# Patient Record
Sex: MALE | Race: WHITE | NOT HISPANIC OR LATINO | Employment: STUDENT | URBAN - METROPOLITAN AREA
[De-identification: names, ages, dates, MRNs, and addresses within clinical notes are randomized per-mention and may not be internally consistent; named-entity substitution may affect disease eponyms.]

---

## 2023-03-10 ENCOUNTER — APPOINTMENT (OUTPATIENT)
Dept: CARDIOLOGY | Facility: CLINIC | Age: 18
End: 2023-03-10
Attending: FAMILY MEDICINE
Payer: COMMERCIAL

## 2023-03-10 ENCOUNTER — APPOINTMENT (OUTPATIENT)
Dept: GENERAL RADIOLOGY | Facility: CLINIC | Age: 18
End: 2023-03-10
Attending: FAMILY MEDICINE
Payer: COMMERCIAL

## 2023-03-10 ENCOUNTER — HOSPITAL ENCOUNTER (EMERGENCY)
Facility: CLINIC | Age: 18
Discharge: HOME | End: 2023-03-10
Attending: FAMILY MEDICINE | Admitting: FAMILY MEDICINE
Payer: COMMERCIAL

## 2023-03-10 VITALS
WEIGHT: 145 LBS | HEIGHT: 72 IN | SYSTOLIC BLOOD PRESSURE: 113 MMHG | RESPIRATION RATE: 18 BRPM | OXYGEN SATURATION: 100 % | HEART RATE: 89 BPM | DIASTOLIC BLOOD PRESSURE: 69 MMHG | TEMPERATURE: 98.7 F | BODY MASS INDEX: 19.64 KG/M2

## 2023-03-10 DIAGNOSIS — R55 SYNCOPE, VASOVAGAL: ICD-10-CM

## 2023-03-10 DIAGNOSIS — S09.90XA INJURY OF HEAD, INITIAL ENCOUNTER: ICD-10-CM

## 2023-03-10 LAB
ALBUMIN SERPL BCG-MCNC: 4.7 G/DL (ref 3.5–5.2)
ALP SERPL-CCNC: 100 U/L (ref 40–129)
ALT SERPL W P-5'-P-CCNC: 14 U/L (ref 10–50)
ANION GAP SERPL CALCULATED.3IONS-SCNC: 13 MMOL/L (ref 7–15)
APTT PPP: 32 SECONDS (ref 22–38)
AST SERPL W P-5'-P-CCNC: 26 U/L (ref 10–50)
ATRIAL RATE - MUSE: 77 BPM
BASOPHILS # BLD AUTO: 0.1 10E3/UL (ref 0–0.2)
BASOPHILS NFR BLD AUTO: 1 %
BILIRUB SERPL-MCNC: 0.8 MG/DL
BUN SERPL-MCNC: 15.2 MG/DL (ref 6–20)
CALCIUM SERPL-MCNC: 9.6 MG/DL (ref 8.6–10)
CHLORIDE SERPL-SCNC: 104 MMOL/L (ref 98–107)
CREAT SERPL-MCNC: 1.15 MG/DL (ref 0.67–1.17)
D DIMER PPP FEU-MCNC: 0.3 UG/ML FEU (ref 0–0.5)
DEPRECATED HCO3 PLAS-SCNC: 25 MMOL/L (ref 22–29)
DIASTOLIC BLOOD PRESSURE - MUSE: NORMAL MMHG
EOSINOPHIL # BLD AUTO: 0.1 10E3/UL (ref 0–0.7)
EOSINOPHIL NFR BLD AUTO: 2 %
ERYTHROCYTE [DISTWIDTH] IN BLOOD BY AUTOMATED COUNT: 12.8 % (ref 10–15)
FLUAV RNA SPEC QL NAA+PROBE: NEGATIVE
FLUBV RNA RESP QL NAA+PROBE: NEGATIVE
GFR SERPL CREATININE-BSD FRML MDRD: >90 ML/MIN/1.73M2
GLUCOSE SERPL-MCNC: 96 MG/DL (ref 70–99)
HCT VFR BLD AUTO: 49 % (ref 40–53)
HGB BLD-MCNC: 16.8 G/DL (ref 13.3–17.7)
IMM GRANULOCYTES # BLD: 0 10E3/UL
IMM GRANULOCYTES NFR BLD: 0 %
INR PPP: 1.05 (ref 0.85–1.15)
INTERPRETATION ECG - MUSE: NORMAL
LVEF ECHO: NORMAL
LYMPHOCYTES # BLD AUTO: 2 10E3/UL (ref 0.8–5.3)
LYMPHOCYTES NFR BLD AUTO: 36 %
MCH RBC QN AUTO: 30.1 PG (ref 26.5–33)
MCHC RBC AUTO-ENTMCNC: 34.3 G/DL (ref 31.5–36.5)
MCV RBC AUTO: 88 FL (ref 78–100)
MONOCYTES # BLD AUTO: 0.5 10E3/UL (ref 0–1.3)
MONOCYTES NFR BLD AUTO: 9 %
NEUTROPHILS # BLD AUTO: 2.9 10E3/UL (ref 1.6–8.3)
NEUTROPHILS NFR BLD AUTO: 52 %
NRBC # BLD AUTO: 0 10E3/UL
NRBC BLD AUTO-RTO: 0 /100
NT-PROBNP SERPL-MCNC: <5 PG/ML (ref 0–450)
P AXIS - MUSE: 80 DEGREES
PLATELET # BLD AUTO: 226 10E3/UL (ref 150–450)
POTASSIUM SERPL-SCNC: 4.1 MMOL/L (ref 3.4–5.3)
PR INTERVAL - MUSE: 122 MS
PROT SERPL-MCNC: 7 G/DL (ref 6.4–8.3)
QRS DURATION - MUSE: 80 MS
QT - MUSE: 358 MS
QTC - MUSE: 405 MS
R AXIS - MUSE: 82 DEGREES
RBC # BLD AUTO: 5.59 10E6/UL (ref 4.4–5.9)
RSV RNA SPEC NAA+PROBE: NEGATIVE
SARS-COV-2 RNA RESP QL NAA+PROBE: NEGATIVE
SODIUM SERPL-SCNC: 142 MMOL/L (ref 136–145)
SYSTOLIC BLOOD PRESSURE - MUSE: NORMAL MMHG
T AXIS - MUSE: 42 DEGREES
TROPONIN T SERPL HS-MCNC: <6 NG/L
VENTRICULAR RATE- MUSE: 77 BPM
WBC # BLD AUTO: 5.6 10E3/UL (ref 4–11)

## 2023-03-10 PROCEDURE — 99285 EMERGENCY DEPT VISIT HI MDM: CPT | Mod: 25 | Performed by: FAMILY MEDICINE

## 2023-03-10 PROCEDURE — 71046 X-RAY EXAM CHEST 2 VIEWS: CPT | Mod: 26 | Performed by: RADIOLOGY

## 2023-03-10 PROCEDURE — 71046 X-RAY EXAM CHEST 2 VIEWS: CPT

## 2023-03-10 PROCEDURE — 85610 PROTHROMBIN TIME: CPT | Performed by: FAMILY MEDICINE

## 2023-03-10 PROCEDURE — 87637 SARSCOV2&INF A&B&RSV AMP PRB: CPT | Performed by: FAMILY MEDICINE

## 2023-03-10 PROCEDURE — 90715 TDAP VACCINE 7 YRS/> IM: CPT | Performed by: FAMILY MEDICINE

## 2023-03-10 PROCEDURE — 93306 TTE W/DOPPLER COMPLETE: CPT | Mod: 26 | Performed by: INTERNAL MEDICINE

## 2023-03-10 PROCEDURE — 93005 ELECTROCARDIOGRAM TRACING: CPT | Performed by: FAMILY MEDICINE

## 2023-03-10 PROCEDURE — 85379 FIBRIN DEGRADATION QUANT: CPT | Performed by: FAMILY MEDICINE

## 2023-03-10 PROCEDURE — C9803 HOPD COVID-19 SPEC COLLECT: HCPCS | Performed by: FAMILY MEDICINE

## 2023-03-10 PROCEDURE — 258N000003 HC RX IP 258 OP 636: Performed by: FAMILY MEDICINE

## 2023-03-10 PROCEDURE — 80053 COMPREHEN METABOLIC PANEL: CPT | Performed by: FAMILY MEDICINE

## 2023-03-10 PROCEDURE — 36415 COLL VENOUS BLD VENIPUNCTURE: CPT | Performed by: FAMILY MEDICINE

## 2023-03-10 PROCEDURE — 85025 COMPLETE CBC W/AUTO DIFF WBC: CPT | Performed by: FAMILY MEDICINE

## 2023-03-10 PROCEDURE — 84484 ASSAY OF TROPONIN QUANT: CPT | Performed by: FAMILY MEDICINE

## 2023-03-10 PROCEDURE — 85730 THROMBOPLASTIN TIME PARTIAL: CPT | Performed by: FAMILY MEDICINE

## 2023-03-10 PROCEDURE — 250N000011 HC RX IP 250 OP 636: Performed by: FAMILY MEDICINE

## 2023-03-10 PROCEDURE — 93306 TTE W/DOPPLER COMPLETE: CPT

## 2023-03-10 PROCEDURE — 99284 EMERGENCY DEPT VISIT MOD MDM: CPT | Mod: 25 | Performed by: FAMILY MEDICINE

## 2023-03-10 PROCEDURE — 90471 IMMUNIZATION ADMIN: CPT | Performed by: FAMILY MEDICINE

## 2023-03-10 PROCEDURE — 83880 ASSAY OF NATRIURETIC PEPTIDE: CPT | Performed by: FAMILY MEDICINE

## 2023-03-10 PROCEDURE — 93010 ELECTROCARDIOGRAM REPORT: CPT | Performed by: FAMILY MEDICINE

## 2023-03-10 PROCEDURE — 96360 HYDRATION IV INFUSION INIT: CPT | Performed by: FAMILY MEDICINE

## 2023-03-10 RX ORDER — LIDOCAINE 40 MG/G
CREAM TOPICAL
Status: DISCONTINUED | OUTPATIENT
Start: 2023-03-10 | End: 2023-03-10 | Stop reason: HOSPADM

## 2023-03-10 RX ADMIN — CLOSTRIDIUM TETANI TOXOID ANTIGEN (FORMALDEHYDE INACTIVATED), CORYNEBACTERIUM DIPHTHERIAE TOXOID ANTIGEN (FORMALDEHYDE INACTIVATED), BORDETELLA PERTUSSIS TOXOID ANTIGEN (GLUTARALDEHYDE INACTIVATED), BORDETELLA PERTUSSIS FILAMENTOUS HEMAGGLUTININ ANTIGEN (FORMALDEHYDE INACTIVATED), BORDETELLA PERTUSSIS PERTACTIN ANTIGEN, AND BORDETELLA PERTUSSIS FIMBRIAE 2/3 ANTIGEN 0.5 ML: 5; 2; 2.5; 5; 3; 5 INJECTION, SUSPENSION INTRAMUSCULAR at 11:32

## 2023-03-10 RX ADMIN — SODIUM CHLORIDE 1000 ML: 9 INJECTION, SOLUTION INTRAVENOUS at 11:31

## 2023-03-10 ASSESSMENT — ACTIVITIES OF DAILY LIVING (ADL)
ADLS_ACUITY_SCORE: 35

## 2023-03-10 NOTE — ED TRIAGE NOTES
Pt states that he randomly lost consciousness 2 days ago but did not seek medical attention at that time.  Pt states that since then he has experienced chest pain when taking deep breaths along with being dizzy and lightheaded.       Triage Assessment     Row Name 03/10/23 1050       Triage Assessment (Adult)    Airway WDL WDL       Respiratory WDL    Respiratory WDL WDL       Skin Circulation/Temperature WDL    Skin Circulation/Temperature WDL WDL       Cardiac WDL    Cardiac WDL WDL       Peripheral/Neurovascular WDL    Peripheral Neurovascular WDL WDL       Cognitive/Neuro/Behavioral WDL    Cognitive/Neuro/Behavioral WDL WDL

## 2023-03-10 NOTE — DISCHARGE INSTRUCTIONS
Home.  Your labs and echo and cxr and ekg did not show any concerning findings.  More likely a physiological fainting episode.  Home with follow up with your MD  Return if any concerns at all.  Eat well and stay hydrated.    Please make an appointment to follow up with Your Primary Care Provider as soon as possible.    Results for orders placed or performed during the hospital encounter of 03/10/23   XR Chest 2 Views     Status: None    Narrative    EXAM: XR CHEST 2 VIEWS  3/10/2023 12:01 PM     HISTORY:  syncope       COMPARISON:  None    FINDINGS:   Trachea is midline. Cardiac silhouette is within normal limits.  Pulmonary vasculature is distinct. No focal airspace opacity, pleural  effusion, pneumothorax.    No acute osseous abnormality. Visualized soft tissues and upper  abdomen are unremarkable.         Impression    IMPRESSION:   No acute focal airspace disease.    I have personally reviewed the examination and initial interpretation  and I agree with the findings.    CHARLENE STONE MD         SYSTEM ID:  [id number]   Partial thromboplastin time     Status: Normal   Result Value Ref Range    aPTT 32 22 - 38 Seconds   INR     Status: Normal   Result Value Ref Range    INR 1.05 0.85 - 1.15   D dimer quantitative     Status: Normal   Result Value Ref Range    D-Dimer Quantitative 0.30 0.00 - 0.50 ug/mL FEU    Narrative    This D-dimer assay is intended for use in conjunction with a clinical pretest probability assessment model to exclude pulmonary embolism (PE) and deep venous thrombosis (DVT) in outpatients suspected of PE or DVT. The cut-off value is 0.50 ug/mL FEU.   Comprehensive metabolic panel     Status: Normal   Result Value Ref Range    Sodium 142 136 - 145 mmol/L    Potassium 4.1 3.4 - 5.3 mmol/L    Chloride 104 98 - 107 mmol/L    Carbon Dioxide (CO2) 25 22 - 29 mmol/L    Anion Gap 13 7 - 15 mmol/L    Urea Nitrogen 15.2 6.0 - 20.0 mg/dL    Creatinine 1.15 0.67 - 1.17 mg/dL    Calcium 9.6 8.6 - 10.0  mg/dL    Glucose 96 70 - 99 mg/dL    Alkaline Phosphatase 100 40 - 129 U/L    AST 26 10 - 50 U/L    ALT 14 10 - 50 U/L    Protein Total 7.0 6.4 - 8.3 g/dL    Albumin 4.7 3.5 - 5.2 g/dL    Bilirubin Total 0.8 <=1.2 mg/dL    GFR Estimate >90 >60 mL/min/1.73m2   Troponin T, High Sensitivity     Status: Normal   Result Value Ref Range    Troponin T, High Sensitivity <6 <=22 ng/L   Nt probnp inpatient (BNP)     Status: Normal   Result Value Ref Range    N terminal Pro BNP Inpatient <5 0 - 450 pg/mL   Asymptomatic Influenza A/B, RSV, & SARS-CoV2 PCR (COVID-19) Nasopharyngeal     Status: Normal    Specimen: Nasopharyngeal; Swab   Result Value Ref Range    Influenza A PCR Negative Negative    Influenza B PCR Negative Negative    RSV PCR Negative Negative    SARS CoV2 PCR Negative Negative    Narrative    Testing was performed using the Xpert Xpress CoV2/Flu/RSV Assay on the Cepheid GeneXpert Instrument. This test should be ordered for the detection of SARS-CoV-2, influenza, and RSV viruses in individuals who meet clinical and/or epidemiological criteria. Test performance is unknown in asymptomatic patients. This test is for in vitro diagnostic use under the FDA EUA for laboratories certified under CLIA to perform high or moderate complexity testing. This test has not been FDA cleared or approved. A negative result does not rule out the presence of PCR inhibitors in the specimen or target RNA in concentration below the limit of detection for the assay. If only one viral target is positive but coinfection with multiple targets is suspected, the sample should be re-tested with another FDA cleared, approved, or authorized test, if coinfection would change clinical management. This test was validated by the [facility]. These laboratories are certified under the Clinical Laboratory Improvement Amendments of 1988 (CLIA-88) as qualified to perform high complexity laboratory testing.   CBC with platelets and  differential     Status: None   Result Value Ref Range    WBC Count 5.6 4.0 - 11.0 10e3/uL    RBC Count 5.59 4.40 - 5.90 10e6/uL    Hemoglobin 16.8 13.3 - 17.7 g/dL    Hematocrit 49.0 40.0 - 53.0 %    MCV 88 78 - 100 fL    MCH 30.1 26.5 - 33.0 pg    MCHC 34.3 31.5 - 36.5 g/dL    RDW 12.8 10.0 - 15.0 %    Platelet Count 226 150 - 450 10e3/uL    % Neutrophils 52 %    % Lymphocytes 36 %    % Monocytes 9 %    % Eosinophils 2 %    % Basophils 1 %    % Immature Granulocytes 0 %    NRBCs per 100 WBC 0 <1 /100    Absolute Neutrophils 2.9 1.6 - 8.3 10e3/uL    Absolute Lymphocytes 2.0 0.8 - 5.3 10e3/uL    Absolute Monocytes 0.5 0.0 - 1.3 10e3/uL    Absolute Eosinophils 0.1 0.0 - 0.7 10e3/uL    Absolute Basophils 0.1 0.0 - 0.2 10e3/uL    Absolute Immature Granulocytes 0.0 <=0.4 10e3/uL    Absolute NRBCs 0.0 10e3/uL   EKG 12 lead     Status: None   Result Value Ref Range    Systolic Blood Pressure  mmHg    Diastolic Blood Pressure  mmHg    Ventricular Rate 77 BPM    Atrial Rate 77 BPM    AR Interval 122 ms    QRS Duration 80 ms     ms    QTc 405 ms    P Axis 80 degrees    R AXIS 82 degrees    T Axis 42 degrees    Interpretation ECG       Sinus rhythm  Right atrial enlargement  Borderline ECG  Unconfirmed report - interpretation of this ECG is computer generated - see medical record for final interpretation  Confirmed by - EMERGENCY ROOM, PHYSICIAN ([id number]),  EVELIO PACHEOC ([id number]) on 3/10/2023 11:03:04 AM     Echocardiogram Complete     Status: None   Result Value Ref Range    LVEF  60-65%     Narrative    [id number]  [id number]  [id number]  [id number]^SHILO^JAMEL^ANNETTE     [facility],[place]  Echocardiography Laboratory  [address] [place] [id number]     Name: BRIAN LAGOS  MRN: [id number]  : 2005  Study Date: 03/10/2023 01:36 PM  Age: 17 yrs  Gender: Male  Patient Location: [facility]  Reason For Study: Syncope and Collapse  Ordering Physician: JAMEL LAO  Performed  By: Greta Babcock     BSA: 1.9 m2  Height: 72 in  Weight: 145 lb  BP: 113/70 mmHg  ______________________________________________________________________________  Procedure  Complete Portable Echo Adult.  ______________________________________________________________________________  Interpretation Summary  Global and regional left ventricular function is normal with an EF of 60-65%.  Right ventricular function, chamber size, wall motion, and thickness are  normal.  Pulmonary artery systolic pressure is normal.  The inferior vena cava is normal.  No pericardial effusion is present.  There is no prior study for direct comparison.  ______________________________________________________________________________  Left Ventricle  Global and regional left ventricular function is normal with an EF of 60-65%.  Left ventricular wall thickness is normal. Left ventricular size is normal.  Left ventricular diastolic function is normal. No regional wall motion  abnormalities are seen.     Right Ventricle  Right ventricular function, chamber size, wall motion, and thickness are  normal.     Atria  Both atria appear normal. The atrial septum is intact as assessed by color  Doppler .     Mitral Valve  The mitral valve is normal. Trace mitral insufficiency is present.     Aortic Valve  Aortic valve is normal in structure and function.     Tricuspid Valve  The tricuspid valve is normal. The right ventricular systolic pressure is  approximated at 28.3 mmHg plus the right atrial pressure. Trace to mild  tricuspid insufficiency is present. Pulmonary artery systolic pressure is  normal.     Pulmonic Valve  The pulmonic valve is normal.     Vessels  The thoracic aorta is normal. The pulmonary artery and bifurcation cannot be  assessed. The inferior vena cava is normal.     Pericardium  No pericardial effusion is present.     Compared to Previous Study  There is no prior study for direct  comparison.  ______________________________________________________________________________  MMode/2D Measurements & Calculations  IVSd: 0.77 cm  LVIDd: 4.6 cm  LVIDs: 3.3 cm  LVPWd: 0.73 cm  FS: 28.2 %  LV mass(C)d: 110.4 grams  LV mass(C)dI: 59.4 grams/m2  Ao root diam: 2.7 cm  asc Aorta Diam: 2.3 cm  LVOT diam: 1.8 cm  LVOT area: 2.5 cm2  LA Volume (BP): 34.5 ml  LA Volume Index (BP): 18.5 ml/m2     RWT: 0.32  TAPSE: 2.1 cm     Doppler Measurements & Calculations  MV E max lico: 83.8 cm/sec  MV A max lico: 44.5 cm/sec  MV E/A: 1.9  MV dec slope: 503.0 cm/sec2  MV dec time: 0.17 sec  Ao V2 max: 122.0 cm/sec  Ao max P.0 mmHg  Ao V2 mean: 83.2 cm/sec  Ao mean PG: 3.0 mmHg  Ao V2 VTI: 22.7 cm  WOODY(I,D): 1.9 cm2  WOODY(V,D): 1.8 cm2  LV V1 max P.9 mmHg  LV V1 max: 85.4 cm/sec  LV V1 VTI: 17.3 cm  SV(LVOT): 44.0 ml  SI(LVOT): 23.7 ml/m2  TR max lico: 266.0 cm/sec  TR max P.3 mmHg  RVSP(TR): 31.3 mmHg  AV Lico Ratio (DI): 0.70  WOODY Index (cm2/m2): 1.0  E/E' av.7  Lateral E/e': 4.4  Medial E/e': 5.0  RV S Lico: 11.3 cm/sec     ______________________________________________________________________________  Report approved by: Aida Storm 03/10/2023 02:06 PM         CBC with platelets differential     Status: None    Narrative    The following orders were created for panel order CBC with platelets differential.  Procedure                               Abnormality         Status                     ---------                               -----------         ------                     CBC with platelets and d...[[id number]]                      Final result                 Please view results for these tests on the individual orders.

## 2024-12-12 NOTE — ED PROVIDER NOTES
[place] EMERGENCY DEPARTMENT ([facility])    3/10/23      History     Chief Complaint   Patient presents with     Syncope     The history is provided by the patient and medical records.     Toñito Lee is a 17 year old male who with no relevant medical history on [facility] and care everywhere presents to the ED with syncope.   He states that 2 days ago he lost consciousness.  He was sitting on the toilet at night when he felt like he was fainting and also felt his heart slowing down.  He laid on his bed the next day and rested.  He endorses that he did not hurt anywhere except on his head. He hurt his irght upper forehead and his right lower chin has abrasions. He was by himself when the event happened.  History of nausea is present; however there is no vomiting.  Patient also endorses that on the left side of his chest there is pain.  He experiences the chest pain during deep breaths.  However the pain has reduced since the event 2 days ago.Furthermore he states that this has not happened before. Family hx: His great uncle had heart issues and  early from it.  no headaches, no vision changes, no abdominal symptoms, no vomiting or diarrhea. No hx of blood clots or travelling.   As noted no severe headache at all.  No neck stiffness.  No neurofocal complaints.  No palpitations etc.    Per Epic review:         Past Medical History  History reviewed. No pertinent past medical history.  History reviewed. No pertinent surgical history.  No current outpatient medications on file.    No Known Allergies  Family History  History reviewed. No pertinent family history.  Social History   Social History     Tobacco Use     Smoking status: Some Days     Types: Cigarettes     Smokeless tobacco: Current      Past medical history, past surgical history, medications, allergies, family history, and social history were reviewed with the patient. No additional pertinent items.      A complete review of systems was  Labs are not resulted yet performed with pertinent positives and negatives noted in the HPI, and all other systems negative.    Physical Exam   BP: 113/70  Pulse: 88  Temp: 98.4  F (36.9  C)  Resp: 18  Height: 182.9 cm (6')  Weight: 65.8 kg (145 lb)  SpO2: 99 %  Physical Exam  Vitals and nursing note reviewed.   Constitutional:       General: He is in acute distress.      Appearance: He is well-developed. He is not toxic-appearing or diaphoretic.      Comments: Patient with forehead abrasion noted but alert and orient x3.  Vitally stable here in the ER.  Not confused neurologically intact appropriate   HENT:      Head: Normocephalic.      Comments: TM negative for hemotympanum E patient is forehead abrasion is noted without step-off along with this has 2 small 1 cm lacerations under his chin but no malocclusion or dental changes otherwise noted.  As noted these injuries have been approximately 2 days ago     Right Ear: Tympanic membrane normal.      Left Ear: Tympanic membrane normal.      Nose: Nose normal.      Mouth/Throat:      Mouth: Mucous membranes are moist.      Pharynx: Oropharynx is clear.   Eyes:      General: No scleral icterus.     Extraocular Movements: Extraocular movements intact.      Conjunctiva/sclera: Conjunctivae normal.      Pupils: Pupils are equal, round, and reactive to light.   Neck:      Comments: No midline tenderness  Cardiovascular:      Rate and Rhythm: Normal rate and regular rhythm.      Heart sounds: No murmur heard.  Pulmonary:      Effort: No respiratory distress.   Chest:      Chest wall: No tenderness.   Abdominal:      General: There is no distension.      Palpations: Abdomen is soft.      Tenderness: There is no abdominal tenderness. There is no guarding.   Musculoskeletal:         General: Signs of injury present. No swelling.      Cervical back: Normal range of motion and neck supple. No rigidity or tenderness.      Comments: Abrasion to forehead and to chin lacerations noted   Skin:     General:  Skin is warm and dry.      Capillary Refill: Capillary refill takes less than 2 seconds.      Coloration: Skin is not jaundiced.      Findings: Lesion present. No rash.   Neurological:      General: No focal deficit present.      Mental Status: He is alert and oriented to person, place, and time. Mental status is at baseline.   Psychiatric:      Comments: Appropriate here in the ER.           ED Course, Procedures, & Data     11:13 AM  The patient was seen and examined by  in Room VTB.     Patient valuated here in the ER.    IV established liter normal saline given.  Labs drawn reviewed.  Patient also an echo done along with a chest x-ray EKG.  Findings noted below.    EKG without any blocks or hyperacute rhythm changes or ischemic changes identified    Chest x-ray did not show any significant findings    Patient an echo cardiogram also which was normal    White count 5.6 hemoglobin 16.8.  Platelets 226.  Sodium 142 potassium 4.1.  Gap 13 bicarb 25 creatinine 1.15.  BUN is 15.2.  Glucose 96 liver function test normal limits.  D-dimer is within normal limits  BNP negative troponin negative.  Influenza RSV and COVID were negative.    Patient reevaluated is feeling fine here in the ER did receive a liter of fluids now it is comfortable going home did offer Zio patch patient declines at this point will follow-up with his primary physician    Patient neurologically stable at this point I do not see any indication for head CT patient agrees with plan.  This point to be discharged with head injury instructions follow-up with MD creatinine fluids at this point more likely his symptoms were a vasovagal and more likely physiological response without any family history etc. no sign of any hypertrophic cardiomyopathy findings or concerns.    Procedures       ED Course Selections:        EKG Interpretation:      Interpreted by Art Lr MD  Time reviewed: 1056  Symptoms at time of EKG: syncope   Rhythm: normal sinus    Rate: normal  Axis: normal  Ectopy: none  Conduction: normal  ST Segments/ T Waves: No hyperacute ST-T wave changes  Q Waves: none  Comparison to prior: No old EKG available    Clinical Impression: nsr without hyperacute changes               Results for orders placed or performed during the hospital encounter of 03/10/23   XR Chest 2 Views     Status: None    Narrative    EXAM: XR CHEST 2 VIEWS  3/10/2023 12:01 PM     HISTORY:  syncope       COMPARISON:  None    FINDINGS:   Trachea is midline. Cardiac silhouette is within normal limits.  Pulmonary vasculature is distinct. No focal airspace opacity, pleural  effusion, pneumothorax.    No acute osseous abnormality. Visualized soft tissues and upper  abdomen are unremarkable.         Impression    IMPRESSION:   No acute focal airspace disease.    I have personally reviewed the examination and initial interpretation  and I agree with the findings.    CHARLENE STONE MD         SYSTEM ID:  [id number]   Partial thromboplastin time     Status: Normal   Result Value Ref Range    aPTT 32 22 - 38 Seconds   INR     Status: Normal   Result Value Ref Range    INR 1.05 0.85 - 1.15   D dimer quantitative     Status: Normal   Result Value Ref Range    D-Dimer Quantitative 0.30 0.00 - 0.50 ug/mL FEU    Narrative    This D-dimer assay is intended for use in conjunction with a clinical pretest probability assessment model to exclude pulmonary embolism (PE) and deep venous thrombosis (DVT) in outpatients suspected of PE or DVT. The cut-off value is 0.50 ug/mL FEU.   Comprehensive metabolic panel     Status: Normal   Result Value Ref Range    Sodium 142 136 - 145 mmol/L    Potassium 4.1 3.4 - 5.3 mmol/L    Chloride 104 98 - 107 mmol/L    Carbon Dioxide (CO2) 25 22 - 29 mmol/L    Anion Gap 13 7 - 15 mmol/L    Urea Nitrogen 15.2 6.0 - 20.0 mg/dL    Creatinine 1.15 0.67 - 1.17 mg/dL    Calcium 9.6 8.6 - 10.0 mg/dL    Glucose 96 70 - 99 mg/dL    Alkaline Phosphatase 100 40 - 129 U/L     AST 26 10 - 50 U/L    ALT 14 10 - 50 U/L    Protein Total 7.0 6.4 - 8.3 g/dL    Albumin 4.7 3.5 - 5.2 g/dL    Bilirubin Total 0.8 <=1.2 mg/dL    GFR Estimate >90 >60 mL/min/1.73m2   Troponin T, High Sensitivity     Status: Normal   Result Value Ref Range    Troponin T, High Sensitivity <6 <=22 ng/L   Nt probnp inpatient (BNP)     Status: Normal   Result Value Ref Range    N terminal Pro BNP Inpatient <5 0 - 450 pg/mL   Asymptomatic Influenza A/B, RSV, & SARS-CoV2 PCR (COVID-19) Nasopharyngeal     Status: Normal    Specimen: Nasopharyngeal; Swab   Result Value Ref Range    Influenza A PCR Negative Negative    Influenza B PCR Negative Negative    RSV PCR Negative Negative    SARS CoV2 PCR Negative Negative    Narrative    Testing was performed using the Xpert Xpress CoV2/Flu/RSV Assay on the Cepheid GeneXpert Instrument. This test should be ordered for the detection of SARS-CoV-2, influenza, and RSV viruses in individuals who meet clinical and/or epidemiological criteria. Test performance is unknown in asymptomatic patients. This test is for in vitro diagnostic use under the FDA EUA for laboratories certified under CLIA to perform high or moderate complexity testing. This test has not been FDA cleared or approved. A negative result does not rule out the presence of PCR inhibitors in the specimen or target RNA in concentration below the limit of detection for the assay. If only one viral target is positive but coinfection with multiple targets is suspected, the sample should be re-tested with another FDA cleared, approved, or authorized test, if coinfection would change clinical management. This test was validated by the [facility]. These laboratories are certified under the Clinical Laboratory Improvement Amendments of 1988 (CLIA-88) as qualified to perform high complexity laboratory testing.   CBC with platelets and differential     Status: None   Result Value Ref Range    WBC Count 5.6 4.0 - 11.0  10e3/uL    RBC Count 5.59 4.40 - 5.90 10e6/uL    Hemoglobin 16.8 13.3 - 17.7 g/dL    Hematocrit 49.0 40.0 - 53.0 %    MCV 88 78 - 100 fL    MCH 30.1 26.5 - 33.0 pg    MCHC 34.3 31.5 - 36.5 g/dL    RDW 12.8 10.0 - 15.0 %    Platelet Count 226 150 - 450 10e3/uL    % Neutrophils 52 %    % Lymphocytes 36 %    % Monocytes 9 %    % Eosinophils 2 %    % Basophils 1 %    % Immature Granulocytes 0 %    NRBCs per 100 WBC 0 <1 /100    Absolute Neutrophils 2.9 1.6 - 8.3 10e3/uL    Absolute Lymphocytes 2.0 0.8 - 5.3 10e3/uL    Absolute Monocytes 0.5 0.0 - 1.3 10e3/uL    Absolute Eosinophils 0.1 0.0 - 0.7 10e3/uL    Absolute Basophils 0.1 0.0 - 0.2 10e3/uL    Absolute Immature Granulocytes 0.0 <=0.4 10e3/uL    Absolute NRBCs 0.0 10e3/uL   EKG 12 lead     Status: None   Result Value Ref Range    Systolic Blood Pressure  mmHg    Diastolic Blood Pressure  mmHg    Ventricular Rate 77 BPM    Atrial Rate 77 BPM    VT Interval 122 ms    QRS Duration 80 ms     ms    QTc 405 ms    P Axis 80 degrees    R AXIS 82 degrees    T Axis 42 degrees    Interpretation ECG       Sinus rhythm  Right atrial enlargement  Borderline ECG  Unconfirmed report - interpretation of this ECG is computer generated - see medical record for final interpretation  Confirmed by - EMERGENCY ROOM, PHYSICIAN ([id number]),  EVELIO PACHECO ([id number]) on 3/10/2023 11:03:04 AM     Echocardiogram Complete     Status: None   Result Value Ref Range    LVEF  60-65%     Narrative    [id number]  [id number]  [id number]  [id number]^SHILO^JAMEL^ANNETTE     [facility],[place]  Echocardiography Laboratory  [address] [place] [id number]     Name: BRIAN LAGOS  MRN: [id number]  : 2005  Study Date: 03/10/2023 01:36 PM  Age: 17 yrs  Gender: Male  Patient Location: [facility]  Reason For Study: Syncope and Collapse  Ordering Physician: JAMEL LAO  Performed By: Greta Babcock     BSA: 1.9 m2  Height: 72 in  Weight: 145 lb  BP: 113/70  mmHg  ______________________________________________________________________________  Procedure  Complete Portable Echo Adult.  ______________________________________________________________________________  Interpretation Summary  Global and regional left ventricular function is normal with an EF of 60-65%.  Right ventricular function, chamber size, wall motion, and thickness are  normal.  Pulmonary artery systolic pressure is normal.  The inferior vena cava is normal.  No pericardial effusion is present.  There is no prior study for direct comparison.  ______________________________________________________________________________  Left Ventricle  Global and regional left ventricular function is normal with an EF of 60-65%.  Left ventricular wall thickness is normal. Left ventricular size is normal.  Left ventricular diastolic function is normal. No regional wall motion  abnormalities are seen.     Right Ventricle  Right ventricular function, chamber size, wall motion, and thickness are  normal.     Atria  Both atria appear normal. The atrial septum is intact as assessed by color  Doppler .     Mitral Valve  The mitral valve is normal. Trace mitral insufficiency is present.     Aortic Valve  Aortic valve is normal in structure and function.     Tricuspid Valve  The tricuspid valve is normal. The right ventricular systolic pressure is  approximated at 28.3 mmHg plus the right atrial pressure. Trace to mild  tricuspid insufficiency is present. Pulmonary artery systolic pressure is  normal.     Pulmonic Valve  The pulmonic valve is normal.     Vessels  The thoracic aorta is normal. The pulmonary artery and bifurcation cannot be  assessed. The inferior vena cava is normal.     Pericardium  No pericardial effusion is present.     Compared to Previous Study  There is no prior study for direct comparison.  ______________________________________________________________________________  MMode/2D Measurements &  Calculations  IVSd: 0.77 cm  LVIDd: 4.6 cm  LVIDs: 3.3 cm  LVPWd: 0.73 cm  FS: 28.2 %  LV mass(C)d: 110.4 grams  LV mass(C)dI: 59.4 grams/m2  Ao root diam: 2.7 cm  asc Aorta Diam: 2.3 cm  LVOT diam: 1.8 cm  LVOT area: 2.5 cm2  LA Volume (BP): 34.5 ml  LA Volume Index (BP): 18.5 ml/m2     RWT: 0.32  TAPSE: 2.1 cm     Doppler Measurements & Calculations  MV E max lico: 83.8 cm/sec  MV A max lico: 44.5 cm/sec  MV E/A: 1.9  MV dec slope: 503.0 cm/sec2  MV dec time: 0.17 sec  Ao V2 max: 122.0 cm/sec  Ao max P.0 mmHg  Ao V2 mean: 83.2 cm/sec  Ao mean PG: 3.0 mmHg  Ao V2 VTI: 22.7 cm  WOODY(I,D): 1.9 cm2  WOODY(V,D): 1.8 cm2  LV V1 max P.9 mmHg  LV V1 max: 85.4 cm/sec  LV V1 VTI: 17.3 cm  SV(LVOT): 44.0 ml  SI(LVOT): 23.7 ml/m2  TR max lico: 266.0 cm/sec  TR max P.3 mmHg  RVSP(TR): 31.3 mmHg  AV Lico Ratio (DI): 0.70  WOODY Index (cm2/m2): 1.0  E/E' av.7  Lateral E/e': 4.4  Medial E/e': 5.0  RV S Lico: 11.3 cm/sec     ______________________________________________________________________________  Report approved by: Aida Storm 03/10/2023 02:06 PM         CBC with platelets differential     Status: None    Narrative    The following orders were created for panel order CBC with platelets differential.  Procedure                               Abnormality         Status                     ---------                               -----------         ------                     CBC with platelets and d...[[id number]]                      Final result                 Please view results for these tests on the individual orders.     Medications   0.9% sodium chloride BOLUS (0 mLs Intravenous Stopped 3/10/23 1242)   Tdap (tetanus-diphtheria-acell pertussis) (ADACEL) injection 0.5 mL (0.5 mLs Intramuscular $Given 3/10/23 1132)     Labs Ordered and Resulted from Time of ED Arrival to Time of ED Departure   PARTIAL THROMBOPLASTIN TIME - Normal       Result Value    aPTT 32     INR - Normal    INR 1.05     D DIMER  QUANTITATIVE - Normal    D-Dimer Quantitative 0.30     COMPREHENSIVE METABOLIC PANEL - Normal    Sodium 142      Potassium 4.1      Chloride 104      Carbon Dioxide (CO2) 25      Anion Gap 13      Urea Nitrogen 15.2      Creatinine 1.15      Calcium 9.6      Glucose 96      Alkaline Phosphatase 100      AST 26      ALT 14      Protein Total 7.0      Albumin 4.7      Bilirubin Total 0.8      GFR Estimate >90     TROPONIN T, HIGH SENSITIVITY - Normal    Troponin T, High Sensitivity <6     NT PROBNP INPATIENT - Normal    N terminal Pro BNP Inpatient <5     INFLUENZA A/B, RSV, & SARS-COV2 PCR - Normal    Influenza A PCR Negative      Influenza B PCR Negative      RSV PCR Negative      SARS CoV2 PCR Negative     CBC WITH PLATELETS AND DIFFERENTIAL    WBC Count 5.6      RBC Count 5.59      Hemoglobin 16.8      Hematocrit 49.0      MCV 88      MCH 30.1      MCHC 34.3      RDW 12.8      Platelet Count 226      % Neutrophils 52      % Lymphocytes 36      % Monocytes 9      % Eosinophils 2      % Basophils 1      % Immature Granulocytes 0      NRBCs per 100 WBC 0      Absolute Neutrophils 2.9      Absolute Lymphocytes 2.0      Absolute Monocytes 0.5      Absolute Eosinophils 0.1      Absolute Basophils 0.1      Absolute Immature Granulocytes 0.0      Absolute NRBCs 0.0       Echocardiogram Complete   Final Result      XR Chest 2 Views   Final Result   IMPRESSION:    No acute focal airspace disease.      I have personally reviewed the examination and initial interpretation   and I agree with the findings.      CHARLENE STONE MD            SYSTEM ID:  [id number]             Critical care was not performed.     Medical Decision Making  The patient's presentation was of moderate complexity (an acute illness with systemic symptoms).    The patient's evaluation involved:  review of 3+ test result(s) ordered prior to this encounter (see separate area of note for details)  strong consideration of a test (see separate area of note  for details) that was ultimately deferred    The patient's management necessitated high risk (a decision regarding hospitalization).      Assessment & Plan   17-year-old male presents ER with syncopal episode in the last 36 hours.  Patient noted after going the bathroom and gotten up felt very warm/diaphoretic although no palpitations or chest pain had a syncopal episode with abrasion to his forehead and a couple small chin lacerations.  Patient had brief loss consciousness otherwise no seizure activity not on anticoagulants no family history of hypertrophic cardiomyopathy no history of cardiac problems otherwise etc.  Patient valuated here after at least 36 hours plus neurologically intact no headache minimal nausea abrasion to forehead to 1 cm lacerations under the chin without dental injuries no malocclusion otherwise no neck pain neurologically intact otherwise.  Patient valuated with EKG chest x-ray labs given IV fluids echocardiogram no significant findings noted.  This point patient also did receive a Tdap for his tetanus being greater than 10 years.  Patient declines Zio patch at this point will follow-up with primary physician within discharge most likely vasovagal syncope with mild head injury.       I have reviewed the nursing notes. I have reviewed the findings, diagnosis, plan and need for follow up with the patient.    There are no discharge medications for this patient.      Final diagnoses:   Syncope, vasovagal   Injury of head, initial encounter   I, JAN MARTINEZ, am serving as a trained medical scribe to document services personally performed by Art Lr MD, based on the provider's statements to me.     IArt MD, was physically present and have reviewed and verified the accuracy of this note documented by JAN MARTINEZ.    Art Lr MD.     [facility] EMERGENCY DEPARTMENT  3/10/2023    This note was created at least in part by the use of dragon  voice dictation system. Inadvertent typographical errors may still exist.  Art Lr MD.    Patient evaluated in the emergency department during the COVID-19 pandemic period. Careful attention to patients safety was addressed throughout the evaluation. Evaluation and treatment management was initiated with disposition made efficiently and appropriate as possible to minimize any risk of potential exposure to patient during this evaluation.       Art Lr MD  03/10/23 2215       Art Lr MD  03/13/23 2134       Art Lr MD  03/14/23 2139       Art Lr MD  03/29/23 0755       Art Lr MD  03/30/23 1331       Art Lr MD  04/01/23 0902       Art Lr MD  04/04/23 0749       Art Lr MD  04/05/23 1810